# Patient Record
Sex: MALE | Race: WHITE
[De-identification: names, ages, dates, MRNs, and addresses within clinical notes are randomized per-mention and may not be internally consistent; named-entity substitution may affect disease eponyms.]

---

## 2020-06-07 ENCOUNTER — HOSPITAL ENCOUNTER (EMERGENCY)
Dept: HOSPITAL 46 - ED | Age: 70
End: 2020-06-07
Payer: MEDICARE

## 2020-06-07 VITALS — WEIGHT: 139.99 LBS | HEIGHT: 70 IN | BODY MASS INDEX: 20.04 KG/M2

## 2020-06-07 DIAGNOSIS — S92.324A: Primary | ICD-10-CM

## 2020-06-07 DIAGNOSIS — Z79.899: ICD-10-CM

## 2020-06-07 DIAGNOSIS — F17.200: ICD-10-CM
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
## 2020-06-07 DIAGNOSIS — S92.334A: ICD-10-CM

## 2020-06-07 DIAGNOSIS — S29.9XXA: ICD-10-CM

## 2020-06-07 DIAGNOSIS — I10: ICD-10-CM

## 2020-06-07 NOTE — XMS
Encounter Summary
  Created on: 2020
 
 OswaldHudsonWalker Kobe
 External Reference #: [id number]
 : [birth year]
 Sex: Male
 
 Demographics
 
 
+-----------------------+----------------------+
| Address               | [address]      |
|                       | [place]  [address] |
+-----------------------+----------------------+
| Home Phone            | [phone]      |
+-----------------------+----------------------+
| Preferred Language    | Unknown              |
+-----------------------+----------------------+
| Marital Status        |               |
+-----------------------+----------------------+
| Church Affiliation | Unknown              |
+-----------------------+----------------------+
| Race                  | Unknown              |
+-----------------------+----------------------+
| Ethnic Group          | Unknown              |
+-----------------------+----------------------+
 
 
 Author
 
 
+--------------+--------------------------------------------+
| Author       | [facility]  |
|              | and [place]                                |
+--------------+--------------------------------------------+
| Organization | [facility]  |
|              | and [place]                                |
+--------------+--------------------------------------------+
| Address      | Unknown                                    |
+--------------+--------------------------------------------+
| Phone        | Unavailable                                |
+--------------+--------------------------------------------+
 
 
 
 Support
 
 
+---------------+--------------+-------------------+-----------------+
| Name          | Relationship | Address           | Phone           |
+---------------+--------------+-------------------+-----------------+
| Simona Kaiser   | ECON         | [address]      | [phone] |
|               |              | [place]   |                 |
|               |              | [address]             |                 |
+---------------+--------------+-------------------+-----------------+
| Yaima Bourne | ECON         | Unknown           | [phone] |
+---------------+--------------+-------------------+-----------------+
 
 
 
 
 Care Team Providers
 
 
+----------------------------+------+-----------------+
| Care Team Member Name      | Role | Phone           |
+----------------------------+------+-----------------+
| Michael Sinha  | PCP  | [phone] |
| MD                         |      |                 |
+----------------------------+------+-----------------+
 
 
 
 Encounter Details
 
 
+--------+-------------+----------------------+----------------------+----------------------
+
| Date   | Type        | Department           | Care Team            | Description          
|
+--------+-------------+----------------------+----------------------+----------------------
+
| / | Transcribed |   [facility]          |   Michael Sinha  | Syncope (Primary Dx) 
|
| 2014   |  Orders     | CARDIOLOGY  [address]    | MD [address]  [address] |                      
|
|        |             | [place], |  [place]         |                      
|
|        |             |  [place] [address]       | [place]        |                      
|
|        |             | [phone]         | [address]           |                      
|
|        |             |                      | [phone]         |                      
|
|        |             |                      | [phone] (Fax)   |                      
|
+--------+-------------+----------------------+----------------------+----------------------
+
 
 
 
 Social History
 
 
+----------------+-------+-----------+--------+------+
| Tobacco Use    | Types | Packs/Day | Years  | Date |
|                |       |           | Used   |      |
+----------------+-------+-----------+--------+------+
| Never Assessed |       |           |        |      |
+----------------+-------+-----------+--------+------+
 
 
 
+------------------+---------------+
| Sex Assigned at  | Date Recorded |
| Birth            |               |
+------------------+---------------+
| Not on file      |               |
 
+------------------+---------------+
 
 
 
+----------------+-------------+-------------+
| Job Start Date | Occupation  | Industry    |
+----------------+-------------+-------------+
| Not on file    | Not on file | Not on file |
+----------------+-------------+-------------+
 
 
 
+----------------+--------------+------------+
| Travel History | Travel Start | Travel End |
+----------------+--------------+------------+
 
 
 
+-------------------------------------+
| No recent travel history available. |
+-------------------------------------+
 documented as of this encounter
 
 Plan of Treatment
 Not on filedocumented as of this encounter
 
 Results
 Holter monitor - 48 hour (10/09/2014 12:03 PM PDT)
 
+------------------------------------------------------------------------+--------------+
| Narrative                                                              | Performed At |
+------------------------------------------------------------------------+--------------+
|   Jacqueline Honeycutt MD       10/9/2014 12:03           PATIENT NAME:   |              |
|   Aaron Akers     : 1950:             AGE: 64 y.o.      |              |
| MEDICAL RECORD NUMBER:   [id number]     PRIMARY CARE:  Michael       |              |
| [facility]ha  [place] CARDIOLOGIST:  Jacqueline Honeycutt MD           |              |
| 48-HOUR HOLTER MONITOR REPORT     DATE:    10/6/2014                   |              |
| IMPRESSION:     1.   The predominant rhythm is normal sinus with the   |              |
| heart rate   ranging between 55 and 100 beats per minute.   The        |              |
| average heart   rate was 73 beats per minute during the 48:32 hour     |              |
| recording.  2.   Very rare ventricular events including one couplet.   |              |
| 3.   Very rare supraventricular events including two couplets.  4.     |              |
|   No bradycardia or pauses.  5.   Patient did not return a diary.      |              |
|    Signed by: Jacqueline Honeycutt MD [facility]     10/9/2014, 11:53           |              |
+------------------------------------------------------------------------+--------------+
 
 
 
+-------------------------------------------------------------------------------------------
--------------------------------------------------------------------------------------------
------+
| Procedure Note                                                                            
                                                                                            
      |
+-------------------------------------------------------------------------------------------
--------------------------------------------------------------------------------------------
------+
|   Jacqueline Honeycutt MD - 10/09/2014 11:52 AM PDT  Formatting of this note might be       
                                                                                            
      |
 
| different from the original.PATIENT NAME:  Aaron Akers  : 1950:            
                                                                                            
      |
| AGE: 64 y.o.MEDICAL RECORD NUMBER:  [id number] PRIMARY CARE:Michael Shukla             
                                                                                            
      |
| [place] CARDIOLOGIST:Jacqueline Honeycutt MD 48-HOUR HOLTER MONITOR REPORTDATE:       
                                                                                            
      |
| 10/6/2014    IMPRESSION:1.  The predominant rhythm is normal sinus with the heart rate    
                                                                                            
      |
| ranging between 55 and 100 beats per minute.  The average heart rate was 73 beats per     
                                                                                            
      |
| minute during the 48:32 hour recording.2.  Very rare ventricular events including one     
                                                                                            
      |
| couplet.3.  Very rare supraventricular events including two couplets.4.  No bradycardia   
                                                                                            
      |
| or pauses.5.  Patient did not return a diary.Signed by: Jacqueline Honeycutt MD [facility]         
                                                                                            
      |
| 10/9/2014, 11:53                                                                          
                                                                                            
      |
|READING CARDIOLOGIST:                                                                      
                                                                                            
      |
|Jacqueline Honeycutt MD                                                                       
                                                                                            
      |
|                                                                                           
                                                                                            
      |
|                                                                                           
                                                                                            
      |
|48-HOUR HOLTER MONITOR REPORT                                                              
                                                                                            
      |
|                                                                                           
                                                                                            
      |
|DATE:   10/6/2014                                                                          
                                                                                            
      |
|                                                                                           
                                                                                            
      |
|                                                                                           
                                                                                            
      |
|IMPRESSION:                                                                                
                                                                                            
     |
|                                                                                           
                                                                                            
      |
 
|1.  The predominant rhythm is normal sinus with the heart rate ranging between 55 and 100 b
eats per minute.  The average heart rate was 73 beats per minute during the 48:32 hour recor
ding. |
|2.  Very rare ventricular events including one couplet.                                    
                                                                                            
      |
|3.  Very rare supraventricular events including two couplets.                              
                                                                                            
      |
|4.  No bradycardia or pauses.                                                              
                                                                                            
      |
|5.  Patient did not return a diary.                                                        
                                                                                            
      |
|                                                                                           
                                                                                            
      |
|                                                                                           
                                                                                            
      |
|Signed by: Jacqueline Honeycutt MD [facility]                                                       
                                                                                            
      |
|  10/9/2014, 11:53                                                                         
                                                                                            
      |
+-------------------------------------------------------------------------------------------
--------------------------------------------------------------------------------------------
------+
 documented in this encounter
 
 Visit Diagnoses
 
 
+-------------------------------------------+
| Diagnosis                                 |
+-------------------------------------------+
|   Syncope - Primary  Syncope and collapse |
+-------------------------------------------+
 documented in this encounter

## 2020-06-07 NOTE — XMS
Encounter Summary
  Created on: 2020
 
 OswaldHudsonWalker Kobe
 External Reference #: [id number]
 : [birth year]
 Sex: Male
 
 Demographics
 
 
+-----------------------+----------------------+
| Address               | [address]      |
|                       | [place]  [address] |
+-----------------------+----------------------+
| Home Phone            | [phone]      |
+-----------------------+----------------------+
| Preferred Language    | Unknown              |
+-----------------------+----------------------+
| Marital Status        |               |
+-----------------------+----------------------+
| Baptism Affiliation | Unknown              |
+-----------------------+----------------------+
| Race                  | Unknown              |
+-----------------------+----------------------+
| Ethnic Group          | Unknown              |
+-----------------------+----------------------+
 
 
 Author
 
 
+--------------+--------------------------------------------+
| Author       | [facility]  |
|              | and [facility]                                |
+--------------+--------------------------------------------+
| Organization | [facility]  |
|              | and [place]                                |
+--------------+--------------------------------------------+
| Address      | Unknown                                    |
+--------------+--------------------------------------------+
| Phone        | Unavailable                                |
+--------------+--------------------------------------------+
 
 
 
 Support
 
 
+---------------+--------------+-------------------+-----------------+
| Name          | Relationship | Address           | Phone           |
+---------------+--------------+-------------------+-----------------+
| Simona Kaiesr   | ECON         | [address]      | [phone] |
|               |              | [place]   |                 |
|               |              | [address]             |                 |
+---------------+--------------+-------------------+-----------------+
| Yaima Bourne | ECON         | Unknown           | [phone] |
+---------------+--------------+-------------------+-----------------+
 
 
 
 
 Care Team Providers
 
 
+----------------------------+------+-----------------+
| Care Team Member Name      | Role | Phone           |
+----------------------------+------+-----------------+
| Michael Sinha  | PCP  | [phone] |
| MD                         |      |                 |
+----------------------------+------+-----------------+
 
 
 
 Encounter Details
 
 
+--------+-----------+----------------------+----------------------+-------------+
| Date   | Type      | Department           | Care Team            | Description |
+--------+-----------+----------------------+----------------------+-------------+
| 10/06/ | Hospital  |   [facility] |   Michael Sinha  | Syncope     |
| 2014   | Encounter |  [facility]     | MD [facility]  [address] |             |
|        |           | MEDICINE  [address]      |  [address]         |             |
|        |           | [place]  [place], | [place]        |             |
|        |           |  [place] [address]       | [address]           |             |
|        |           | [phone]         | [phone]         |             |
|        |           |                      | [phone] (Fax)   |             |
+--------+-----------+----------------------+----------------------+-------------+
 
 
 
 Social History
 
 
+----------------+-------+-----------+--------+------+
| Tobacco Use    | Types | Packs/Day | Years  | Date |
|                |       |           | Used   |      |
+----------------+-------+-----------+--------+------+
| Never Assessed |       |           |        |      |
+----------------+-------+-----------+--------+------+
 
 
 
+------------------+---------------+
| Sex Assigned at  | Date Recorded |
| Birth            |               |
+------------------+---------------+
| Not on file      |               |
+------------------+---------------+
 
 
 
+----------------+-------------+-------------+
| Job Start Date | Occupation  | Industry    |
+----------------+-------------+-------------+
| Not on file    | Not on file | Not on file |
+----------------+-------------+-------------+
 
 
 
 
+----------------+--------------+------------+
| Travel History | Travel Start | Travel End |
+----------------+--------------+------------+
 
 
 
+-------------------------------------+
| No recent travel history available. |
+-------------------------------------+
 documented as of this encounter
 
 Plan of Treatment
 Not on filedocumented as of this encounter
 
 Procedures
 
 
+----------------------+--------+-------------+----------------------+----------------------
+
| Procedure Name       | Priori | Date/Time   | Associated Diagnosis | Comments             
|
|                      | ty     |             |                      |                      
|
+----------------------+--------+-------------+----------------------+----------------------
+
| HOLTER MONITOR - 48  | Routin | 10/09/2014  |   Syncope            |   Results for this   
|
| HOUR                 | e      | 12:03 PM    |                      | procedure are in the 
|
|                      |        | PDT         |                      |  results section.    
|
+----------------------+--------+-------------+----------------------+----------------------
+
 documented in this encounter
 
 Results
 Holter monitor - 48 hour (10/09/2014 12:03 PM PDT)
 
+------------------------------------------------------------------------+--------------+
| Narrative                                                              | Performed At |
+------------------------------------------------------------------------+--------------+
|   Jacqueline Honeycutt MD       10/9/2014 12:03           PATIENT NAME:   |              |
|   Aaron Akers     : 1950:             AGE: 64 y.o.      |              |
| MEDICAL RECORD NUMBER:   [id number]     PRIMARY CARE:  Michael       |              |
| [facility] HAMMOND CARDIOLOGIST:  Jacqueline Honeycutt MD           |              |
| 48-HOUR HOLTER MONITOR REPORT     DATE:    10/6/2014                   |              |
| IMPRESSION:     1.   The predominant rhythm is normal sinus with the   |              |
| heart rate   ranging between 55 and 100 beats per minute.   The        |              |
| average heart   rate was 73 beats per minute during the 48:32 hour     |              |
| recording.  2.   Very rare ventricular events including one couplet.   |              |
| 3.   Very rare supraventricular events including two couplets.  4.     |              |
|   No bradycardia or pauses.  5.   Patient did not return a diary.      |              |
|    Signed by: Jacqueline Honeycutt MD [facility]     10/9/2014, 11:53           |              |
+------------------------------------------------------------------------+--------------+
 
 
 
+-------------------------------------------------------------------------------------------
--------------------------------------------------------------------------------------------
 
------+
| Procedure Note                                                                            
                                                                                            
      |
+-------------------------------------------------------------------------------------------
--------------------------------------------------------------------------------------------
------+
|   Jacqueline Honeycutt MD - 10/09/2014 11:52 AM PDT  Formatting of this note might be       
                                                                                            
      |
| different from the original.PATIENT NAME:  Aaron Akers  : 1950:            
                                                                                            
      |
| AGE: 64 y.o.MEDICAL RECORD NUMBER:  [id number] PRIMARY CARE:Michael Shukla             
                                                                                            
      |
| MaxxING CARDIOLOGIST:Jacqueline Honeycutt MD 48-HOUR HOLTER MONITOR REPORTDATE:       
                                                                                            
      |
| 10/6/2014    IMPRESSION:1.  The predominant rhythm is normal sinus with the heart rate    
                                                                                            
      |
| ranging between 55 and 100 beats per minute.  The average heart rate was 73 beats per     
                                                                                            
      |
| minute during the 48:32 hour recording.2.  Very rare ventricular events including one     
                                                                                            
      |
| couplet.3.  Very rare supraventricular events including two couplets.4.  No bradycardia   
                                                                                            
      |
| or pauses.5.  Patient did not return a diary.Signed by: Jacqueline Honeycutt MD [facility]         
                                                                                            
      |
| 10/9/2014, 11:53                                                                          
                                                                                            
      |
|READING CARDIOLOGIST:                                                                      
                                                                                            
      |
|Jacqueline Honeycutt MD                                                                       
                                                                                            
      |
|                                                                                           
                                                                                            
      |
|                                                                                           
                                                                                            
      |
|48-HOUR HOLTER MONITOR REPORT                                                              
                                                                                            
      |
|                                                                                           
                                                                                            
      |
|DATE:   10/6/2014                                                                          
                                                                                            
      |
|                                                                                           
                                                                                            
 
      |
|                                                                                           
                                                                                            
      |
|IMPRESSION:                                                                                
                                                                                            
     |
|                                                                                           
                                                                                            
      |
|1.  The predominant rhythm is normal sinus with the heart rate ranging between 55 and 100 b
eats per minute.  The average heart rate was 73 beats per minute during the 48:32 hour recor
ding. |
|2.  Very rare ventricular events including one couplet.                                    
                                                                                            
      |
|3.  Very rare supraventricular events including two couplets.                              
                                                                                            
      |
|4.  No bradycardia or pauses.                                                              
                                                                                            
      |
|5.  Patient did not return a diary.                                                        
                                                                                            
      |
|                                                                                           
                                                                                            
      |
|                                                                                           
                                                                                            
      |
|Signed by: Jacqueline Honeycutt MD [facility]                                                       
                                                                                            
      |
|  10/9/2014, 11:53                                                                         
                                                                                            
      |
+-------------------------------------------------------------------------------------------
--------------------------------------------------------------------------------------------
------+
 documented in this encounter
 
 Visit Diagnoses
 
 
+---------------------------------+
| Diagnosis                       |
+---------------------------------+
|   Syncope  Syncope and collapse |
+---------------------------------+
 documented in this encounter

## 2020-06-07 NOTE — EKG
[facility]
                                    [address]
                                  [place]  [address]
_________________________________________________________________________________________
                                                                 Signed   
 
 
Normal sinus rhythm
Possible Left atrial enlargement
Rightward axis
Possible Anterior infarct , age undetermined
Abnormal ECG
 
Confirmed by MAMADOU SALCEDO MD ([id number]) on 6/7/2020 7:24:16 PM
 
 
 
 
 
 
 
 
 
 
 
 
 
 
 
 
 
 
 
 
 
 
 
 
 
 
 
 
 
 
 
 
 
 
 
 
 
 
    Electronically Signed By: MAMADOU SALCEDO MD  06/07/20 1924
_________________________________________________________________________________________
PATIENT NAME:     DAYANNA SHIN KASSY                    
MEDICAL RECORD #: [id number]                     Electrocardiogram             
          ACCT #: [id number]  
DATE OF BIRTH:   [birth year]                                       
PHYSICIAN:   MAMADOU SALCEDO MD                   REPORT #: [id number]
REPORT IS CONFIDENTIAL AND NOT TO BE RELEASED WITHOUT AUTHORIZATION

## 2020-06-07 NOTE — XMS
Encounter Summary
  Created on: 2020
 
 OswaldHudsonWalker Kobe
 External Reference #: [id number]
 : [birth year]
 Sex: Male
 
 Demographics
 
 
+-----------------------+----------------------+
| Address               | [address]      |
|                       | [place]  [address] |
+-----------------------+----------------------+
| Home Phone            | [phone]      |
+-----------------------+----------------------+
| Preferred Language    | Unknown              |
+-----------------------+----------------------+
| Marital Status        |               |
+-----------------------+----------------------+
| Yarsani Affiliation | Unknown              |
+-----------------------+----------------------+
| Race                  | Unknown              |
+-----------------------+----------------------+
| Ethnic Group          | Unknown              |
+-----------------------+----------------------+
 
 
 Author
 
 
+--------------+--------------------------------------------+
| Author       | [facility]  |
|              | and [place]                                |
+--------------+--------------------------------------------+
| Organization | [facility]  |
|              | and [place]                                |
+--------------+--------------------------------------------+
| Address      | Unknown                                    |
+--------------+--------------------------------------------+
| Phone        | Unavailable                                |
+--------------+--------------------------------------------+
 
 
 
 Support
 
 
+---------------+--------------+-------------------+-----------------+
| Name          | Relationship | Address           | Phone           |
+---------------+--------------+-------------------+-----------------+
| Simona Kaiser   | ECON         | [address]      | [phone] |
|               |              | [place]   |                 |
|               |              | [id number]             |                 |
+---------------+--------------+-------------------+-----------------+
| Yaima Bourne | ECON         | Unknown           | [phone] |
+---------------+--------------+-------------------+-----------------+
 
 
 
 
 Care Team Providers
 
 
+----------------------------+------+-----------------+
| Care Team Member Name      | Role | Phone           |
+----------------------------+------+-----------------+
| Michael Sinha  | PCP  | [phone] |
| MD                         |      |                 |
+----------------------------+------+-----------------+
 
 
 
 Encounter Details
 
 
+--------+-------------+----------------------+----------------------+----------------------
+
| Date   | Type        | Department           | Care Team            | Description          
|
+--------+-------------+----------------------+----------------------+----------------------
+
| / | Transcribed |   [facility]          |   Michael Sinha  | Syncope (Primary Dx) 
|
| 2014   |  Orders     | CARDIOLOGY  [address]    | MD [address]  [address] |                      
|
|        |             | [place]  [place], |  [place]         |                      
|
|        |             |  [place] [address]       | [place]        |                      
|
|        |             | [phone]         | [address]           |                      
|
|        |             |                      | [phone]         |                      
|
|        |             |                      | [phone] (Fax)   |                      
|
+--------+-------------+----------------------+----------------------+----------------------
+
 
 
 
 Social History
 
 
+----------------+-------+-----------+--------+------+
| Tobacco Use    | Types | Packs/Day | Years  | Date |
|                |       |           | Used   |      |
+----------------+-------+-----------+--------+------+
| Never Assessed |       |           |        |      |
+----------------+-------+-----------+--------+------+
 
 
 
+------------------+---------------+
| Sex Assigned at  | Date Recorded |
| Birth            |               |
+------------------+---------------+
| Not on file      |               |
 
+------------------+---------------+
 
 
 
+----------------+-------------+-------------+
| Job Start Date | Occupation  | Industry    |
+----------------+-------------+-------------+
| Not on file    | Not on file | Not on file |
+----------------+-------------+-------------+
 
 
 
+----------------+--------------+------------+
| Travel History | Travel Start | Travel End |
+----------------+--------------+------------+
 
 
 
+-------------------------------------+
| No recent travel history available. |
+-------------------------------------+
 documented as of this encounter
 
 Plan of Treatment
 Not on filedocumented as of this encounter
 
 Results
 Holter monitor - 48 hour (10/09/2014 12:03 PM PDT)
 
+------------------------------------------------------------------------+--------------+
| Narrative                                                              | Performed At |
+------------------------------------------------------------------------+--------------+
|   Jacqueline Honeycutt MD       10/9/2014 12:03           PATIENT NAME:   |              |
|   Aaron Akers     : 1950:             AGE: 64 y.o.      |              |
| MEDICAL RECORD NUMBER:   [id number]     PRIMARY CARE:  Michael       |              |
| [facility]  [place] CARDIOLOGIST:  Jacqueline Honeycutt MD           |              |
| 48-HOUR HOLTER MONITOR REPORT     DATE:    10/6/2014                   |              |
| IMPRESSION:     1.   The predominant rhythm is normal sinus with the   |              |
| heart rate   ranging between 55 and 100 beats per minute.   The        |              |
| average heart   rate was 73 beats per minute during the 48:32 hour     |              |
| recording.  2.   Very rare ventricular events including one couplet.   |              |
| 3.   Very rare supraventricular events including two couplets.  4.     |              |
|   No bradycardia or pauses.  5.   Patient did not return a diary.      |              |
|    Signed by: Jacqueline Honeycutt MD [facility]     10/9/2014, 11:53           |              |
+------------------------------------------------------------------------+--------------+
 
 
 
+-------------------------------------------------------------------------------------------
--------------------------------------------------------------------------------------------
------+
| Procedure Note                                                                            
                                                                                            
      |
+-------------------------------------------------------------------------------------------
--------------------------------------------------------------------------------------------
------+
|   Jacqueline Honeycutt MD - 10/09/2014 11:52 AM PDT  Formatting of this note might be       
                                                                                            
      |
 
| different from the original.PATIENT NAME:  Aaron Akers  : 1950:            
                                                                                            
      |
| AGE: 64 y.o.MEDICAL RECORD NUMBER:  [id number] PRIMARY CARE:Michael Shukla             
                                                                                            
      |
| [place] CARDIOLOGIST:Jacqueline Honeycutt MD 48-HOUR HOLTER MONITOR REPORTDATE:       
                                                                                            
      |
| 10/6/2014    IMPRESSION:1.  The predominant rhythm is normal sinus with the heart rate    
                                                                                            
      |
| ranging between 55 and 100 beats per minute.  The average heart rate was 73 beats per     
                                                                                            
      |
| minute during the 48:32 hour recording.2.  Very rare ventricular events including one     
                                                                                            
      |
| couplet.3.  Very rare supraventricular events including two couplets.4.  No bradycardia   
                                                                                            
      |
| or pauses.5.  Patient did not return a diary.Signed by: Jacqueline Honeycutt MD [facility]         
                                                                                            
      |
| 10/9/2014, 11:53                                                                          
                                                                                            
      |
|READING CARDIOLOGIST:                                                                      
                                                                                            
      |
|Jacqueline Honeycutt MD                                                                       
                                                                                            
      |
|                                                                                           
                                                                                            
      |
|                                                                                           
                                                                                            
      |
|48-HOUR HOLTER MONITOR REPORT                                                              
                                                                                            
      |
|                                                                                           
                                                                                            
      |
|DATE:   10/6/2014                                                                          
                                                                                            
      |
|                                                                                           
                                                                                            
      |
|                                                                                           
                                                                                            
      |
|IMPRESSION:                                                                                
                                                                                            
     |
|                                                                                           
                                                                                            
      |
 
|1.  The predominant rhythm is normal sinus with the heart rate ranging between 55 and 100 b
eats per minute.  The average heart rate was 73 beats per minute during the 48:32 hour recor
ding. |
|2.  Very rare ventricular events including one couplet.                                    
                                                                                            
      |
|3.  Very rare supraventricular events including two couplets.                              
                                                                                            
      |
|4.  No bradycardia or pauses.                                                              
                                                                                            
      |
|5.  Patient did not return a diary.                                                        
                                                                                            
      |
|                                                                                           
                                                                                            
      |
|                                                                                           
                                                                                            
      |
|Signed by: Jacqueline Honeycutt MD [facility]                                                       
                                                                                            
      |
|  10/9/2014, 11:53                                                                         
                                                                                            
      |
+-------------------------------------------------------------------------------------------
--------------------------------------------------------------------------------------------
------+
 documented in this encounter
 
 Visit Diagnoses
 
 
+-------------------------------------------+
| Diagnosis                                 |
+-------------------------------------------+
|   Syncope - Primary  Syncope and collapse |
+-------------------------------------------+
 documented in this encounter

## 2020-06-07 NOTE — XMS
Clinical Summary
  Created on: 2020
 
 Aaron Akers
 External Reference #: [id number]
 : [birth year]
 Sex: Male
 
 Demographics
 
 
+-----------------------+----------------------+
| Address               | [address]      |
|                       | [place]  [address] |
+-----------------------+----------------------+
| Home Phone            | [phone]      |
+-----------------------+----------------------+
| Preferred Language    | Unknown              |
+-----------------------+----------------------+
| Marital Status        |               |
+-----------------------+----------------------+
| Faith Affiliation | Unknown              |
+-----------------------+----------------------+
| Race                  | Unknown              |
+-----------------------+----------------------+
| Ethnic Group          | Unknown              |
+-----------------------+----------------------+
 
 
 Author
 
 
+--------------+--------------------------------------------+
| Author       | [facility]  |
|              | and [place]                                |
+--------------+--------------------------------------------+
| Organization | [facility] [place]  |
|              | and [place]                                |
+--------------+--------------------------------------------+
| Address      | Unknown                                    |
+--------------+--------------------------------------------+
| Phone        | Unavailable                                |
+--------------+--------------------------------------------+
 
 
 
 Support
 
 
+---------------+--------------+-------------------+-----------------+
| Name          | Relationship | Address           | Phone           |
+---------------+--------------+-------------------+-----------------+
| Simona Kaiser   | ECON         | [address]      | [phone] |
|               |              | [place]   |                 |
|               |              | [address]             |                 |
+---------------+--------------+-------------------+-----------------+
| Yaima Bourne | ECON         | Unknown           | [phone] |
+---------------+--------------+-------------------+-----------------+
 
 
 
 
 Care Team Providers
 
 
+----------------------------+------+-----------------+
| Care Team Member Name      | Role | Phone           |
+----------------------------+------+-----------------+
| Michael Sinha  | PCP  | [phone] |
| MD                         |      |                 |
+----------------------------+------+-----------------+
 
 
 
 Allergies
 Not on File
 
 Medications
 Not on file
 
 Active Problems
 
 
+---------+------------+
| Problem | Noted Date |
+---------+------------+
| Syncope | 2014 |
+---------+------------+
 
 
 
 Social History
 
 
+----------------+-------+-----------+--------+------+
| Tobacco Use    | Types | Packs/Day | Years  | Date |
|                |       |           | Used   |      |
+----------------+-------+-----------+--------+------+
| Never Assessed |       |           |        |      |
+----------------+-------+-----------+--------+------+
 
 
 
+------------------+---------------+
| Sex Assigned at  | Date Recorded |
| Birth            |               |
+------------------+---------------+
| Not on file      |               |
+------------------+---------------+
 
 
 
+----------------+-------------+-------------+
| Job Start Date | Occupation  | Industry    |
+----------------+-------------+-------------+
| Not on file    | Not on file | Not on file |
+----------------+-------------+-------------+
 
 
 
 
+----------------+--------------+------------+
| Travel History | Travel Start | Travel End |
+----------------+--------------+------------+
 
 
 
+-------------------------------------+
| No recent travel history available. |
+-------------------------------------+
 
 
 
 Last Filed Vital Signs
 Not on file
 
 Plan of Treatment
 
 
+----------------------+-----------+-----------+----------+
| Health Maintenance   | Due Date  | Last Done | Comments |
+----------------------+-----------+-----------+----------+
| Vaccine:             | 01/15/196 |           |          |
| Dtap/Tdap/Td (1 -    | 1         |           |          |
| Tdap)                |           |           |          |
+----------------------+-----------+-----------+----------+
| Vaccine: Zoster (1   | 01/15/200 |           |          |
| of 2)                | 0         |           |          |
+----------------------+-----------+-----------+----------+
| Vaccine:             | 01/15/201 |           |          |
| Pneumococcal 65+ (1  | 5         |           |          |
| of 2 - PCV13)        |           |           |          |
+----------------------+-----------+-----------+----------+
| Vaccine: Influenza   |  |           |          |
| (Season Ended)       | 0         |           |          |
+----------------------+-----------+-----------+----------+
 
 
 
 Results
 Not on filefrom Last 3 Months
 
 Insurance
 
 
+-------+--------+-------------+--------+-------+---------+------+
| Payer | Benefi | Subscriber  | Effect | Phone | Address | Type |
|       | t Plan | ID          | gilberto    |       |         |      |
|       |  /     |             | Dates  |       |         |      |
|       | Group  |             |        |       |         |      |
+-------+--------+-------------+--------+-------+---------+------+
| [employer]  | [employer]   | [id number] | 20 |       |         | PPO  |
|       | OOS    | 2           | 14-Pre |       |         |      |
|       | PPO    |             | sent   |       |         |      |
+-------+--------+-------------+--------+-------+---------+------+
 
 
 
+--------------------+--------+-------------+--------+-------------+---------------------+
| Guarantor Name     | [employer] | Relation to | Date   | Phone       | Billing Address     |
 
|                    | t Type |  Patient    | of     |             |                     |
|                    |        |             | Birth  |             |                     |
+--------------------+--------+-------------+--------+-------------+---------------------+
| Aaron Akers | Person | Self        | [birth year]             |   [address]   |
|                    | al/Fam |             | 1950   | [phone] | [place] [address] |
|                    | [place]ney    |             |        | 2 (Home)    |                     |
+--------------------+--------+-------------+--------+-------------+---------------------+
 
 
 
 Advance Directives
 
 
+-----------+-----------------+----------------+-------------+
| Type      | Date Recorded   | Patient        | Explanation |
|           |                 | Representative |             |
+-----------+-----------------+----------------+-------------+
| Power of  |                 |                |             |
|   |                 |                |             |
+-----------+-----------------+----------------+-------------+
| Advance   | 10/6/2014 12:47 |                |             |
| Directive |  PM             |                |             |
+-----------+-----------------+----------------+-------------+

## 2020-06-07 NOTE — XMS
Clinical Summary
  Created on: 2020
 
 Aaron Akers
 External Reference #: [id number]
 : [birth year]
 Sex: Male
 
 Demographics
 
 
+-----------------------+----------------------+
| Address               | [address]      |
|                       | [place]  [address] |
+-----------------------+----------------------+
| Home Phone            | [phone]      |
+-----------------------+----------------------+
| Preferred Language    | Unknown              |
+-----------------------+----------------------+
| Marital Status        |               |
+-----------------------+----------------------+
| Yazidi Affiliation | Unknown              |
+-----------------------+----------------------+
| Race                  | Unknown              |
+-----------------------+----------------------+
| Ethnic Group          | Unknown              |
+-----------------------+----------------------+
 
 
 Author
 
 
+--------------+--------------------------------------------+
| Author       | [facility]  |
|              | and [place]                                |
+--------------+--------------------------------------------+
| Organization | [facility]  |
|              | and [place]                                |
+--------------+--------------------------------------------+
| Address      | Unknown                                    |
+--------------+--------------------------------------------+
| Phone        | Unavailable                                |
+--------------+--------------------------------------------+
 
 
 
 Support
 
 
+---------------+--------------+-------------------+-----------------+
| Name          | Relationship | Address           | Phone           |
+---------------+--------------+-------------------+-----------------+
| Simona Kaiser   | ECON         | [address]      | [phone] |
|               |              | [place]   |                 |
|               |              | [id number]             |                 |
+---------------+--------------+-------------------+-----------------+
| Yaima Bourne | ECON         | Unknown           | [phone] |
+---------------+--------------+-------------------+-----------------+
 
 
 
 
 Care Team Providers
 
 
+----------------------------+------+-----------------+
| Care Team Member Name      | Role | Phone           |
+----------------------------+------+-----------------+
| Michael Sinha  | PCP  | [phone] |
| MD                         |      |                 |
+----------------------------+------+-----------------+
 
 
 
 Allergies
 Not on File
 
 Medications
 Not on file
 
 Active Problems
 
 
+---------+------------+
| Problem | Noted Date |
+---------+------------+
| Syncope | 2014 |
+---------+------------+
 
 
 
 Social History
 
 
+----------------+-------+-----------+--------+------+
| Tobacco Use    | Types | Packs/Day | Years  | Date |
|                |       |           | Used   |      |
+----------------+-------+-----------+--------+------+
| Never Assessed |       |           |        |      |
+----------------+-------+-----------+--------+------+
 
 
 
+------------------+---------------+
| Sex Assigned at  | Date Recorded |
| Birth            |               |
+------------------+---------------+
| Not on file      |               |
+------------------+---------------+
 
 
 
+----------------+-------------+-------------+
| Job Start Date | Occupation  | Industry    |
+----------------+-------------+-------------+
| Not on file    | Not on file | Not on file |
+----------------+-------------+-------------+
 
 
 
 
+----------------+--------------+------------+
| Travel History | Travel Start | Travel End |
+----------------+--------------+------------+
 
 
 
+-------------------------------------+
| No recent travel history available. |
+-------------------------------------+
 
 
 
 Last Filed Vital Signs
 Not on file
 
 Plan of Treatment
 
 
+----------------------+-----------+-----------+----------+
| Health Maintenance   | Due Date  | Last Done | Comments |
+----------------------+-----------+-----------+----------+
| Vaccine:             | 01/15/196 |           |          |
| Dtap/Tdap/Td (1 -    | 1         |           |          |
| Tdap)                |           |           |          |
+----------------------+-----------+-----------+----------+
| Vaccine: Zoster (1   | 01/15/200 |           |          |
| of 2)                | 0         |           |          |
+----------------------+-----------+-----------+----------+
| Vaccine:             | 01/15/201 |           |          |
| Pneumococcal 65+ (1  | 5         |           |          |
| of 2 - PCV13)        |           |           |          |
+----------------------+-----------+-----------+----------+
| Vaccine: Influenza   |  |           |          |
| (Season Ended)       | 0         |           |          |
+----------------------+-----------+-----------+----------+
 
 
 
 Results
 Not on filefrom Last 3 Months
 
 Insurance
 
 
+-------+--------+-------------+--------+-------+---------+------+
| Payer | Benefi | Subscriber  | Effect | Phone | Address | Type |
|       | t Plan | ID          | gilberto    |       |         |      |
|       |  /     |             | Dates  |       |         |      |
|       | Group  |             |        |       |         |      |
+-------+--------+-------------+--------+-------+---------+------+
| [employer]  | [employer]   | [id number] | 20 |       |         | PPO  |
|       | OOS    | 2           | 14-Pre |       |         |      |
|       | PPO    |             | sent   |       |         |      |
+-------+--------+-------------+--------+-------+---------+------+
 
 
 
+--------------------+--------+-------------+--------+-------------+---------------------+
| Guarantor Name     | [employer] | Relation to | Date   | Phone       | Billing Address     |
 
|                    | t Type |  Patient    | of     |             |                     |
|                    |        |             | Birth  |             |                     |
+--------------------+--------+-------------+--------+-------------+---------------------+
| Aaron Akers | Person | Self        | [birth year]             |   [address]   |
|                    | al/Fam |             | 1950   | [phone] | [place] [address] |
|                    | [place]ney    |             |        | 2 (Home)    |                     |
+--------------------+--------+-------------+--------+-------------+---------------------+
 
 
 
 Advance Directives
 
 
+-----------+-----------------+----------------+-------------+
| Type      | Date Recorded   | Patient        | Explanation |
|           |                 | Representative |             |
+-----------+-----------------+----------------+-------------+
| Power of  |                 |                |             |
|   |                 |                |             |
+-----------+-----------------+----------------+-------------+
| Advance   | 10/6/2014 12:47 |                |             |
| Directive |  PM             |                |             |
+-----------+-----------------+----------------+-------------+

## 2022-07-07 NOTE — XMS
Department of Anesthesiology  Preprocedure Note       Name:  Jenna Lemus   Age:  39 y.o.  :  1977                                          MRN:  [id number]         Date:  2022      Surgeon: Robyn oLtt):  Arpita Singh DO    Procedure: Procedure(s):  COLONOSCOPY    Medications prior to admission:   Prior to Admission medications    Medication Sig Start Date End Date Taking? Authorizing Provider   bisacodyl (BISACODYL) 5 MG EC tablet Take two tablets at noon on 22 for colonoscopy bowel prep. 22   Arpita Spoon, DO   PEG 3350-KCl-NaBcb-NaCl-NaSulf (PEG-3350/ELECTROLYTES) 236 g SOLR Mix as directed. Beginning at 4:00pm on 22 drink an eight ounce glass every ten minutes until gone. 22   Arpita Spoon, DO   escitalopram (LEXAPRO) 10 MG tablet Take 1 tablet by mouth daily 22  Moises Summers MD       Current medications:    Current Facility-Administered Medications   Medication Dose Route Frequency Provider Last Rate Last Admin    lactated ringers infusion   IntraVENous Continuous Arpita Spoon, DO        sodium chloride flush 0.9 % injection 5-40 mL  5-40 mL IntraVENous 2 times per day Arpita Spoon, DO        sodium chloride flush 0.9 % injection 5-40 mL  5-40 mL IntraVENous PRN Arpita Spoon, DO        0.9 % sodium chloride infusion   IntraVENous PRN Arpita Spoon, DO           Allergies:  No Known Allergies    Problem List:    Patient Active Problem List   Diagnosis Code    Lumbago M54.50    Backache M54.9       Past Medical History:  History reviewed. No pertinent past medical history.     Past Surgical History:        Procedure Laterality Date    ANTERIOR CRUCIATE LIGAMENT REPAIR Right        Social History:    Social History     Tobacco Use    Smoking status: Never Smoker    Smokeless tobacco: Never Used   Substance Use Topics    Alcohol use: Yes     Comment: rarely                                Counseling given: Not Encounter Summary
  Created on: 2020
 
 OswaldHudsonWalker Kobe
 External Reference #: [id number]
 : [birth year]
 Sex: Male
 
 Demographics
 
 
+-----------------------+----------------------+
| Address               | [address]      |
|                       | [place]  [id number] |
+-----------------------+----------------------+
| Home Phone            | [phone]      |
+-----------------------+----------------------+
| Preferred Language    | Unknown              |
+-----------------------+----------------------+
| Marital Status        |               |
+-----------------------+----------------------+
| Nondenominational Affiliation | Unknown              |
+-----------------------+----------------------+
| Race                  | Unknown              |
+-----------------------+----------------------+
| Ethnic Group          | Unknown              |
+-----------------------+----------------------+
 
 
 Author
 
 
+--------------+--------------------------------------------+
| Author       | [facility]  |
|              | and [place]                                |
+--------------+--------------------------------------------+
| Organization | [facility]  |
|              | and [place]                                |
+--------------+--------------------------------------------+
| Address      | Unknown                                    |
+--------------+--------------------------------------------+
| Phone        | Unavailable                                |
+--------------+--------------------------------------------+
 
 
 
 Support
 
 
+---------------+--------------+-------------------+-----------------+
| Name          | Relationship | Address           | Phone           |
+---------------+--------------+-------------------+-----------------+
| Simona Kaiser   | ECON         | [address]      | [phone] |
|               |              | [place]   |                 |
|               |              | [address]             |                 |
+---------------+--------------+-------------------+-----------------+
| Yaima Bourne | ECON         | Unknown           | [phone] |
+---------------+--------------+-------------------+-----------------+
 
 
 
 
 Care Team Providers
 
 
+----------------------------+------+-----------------+
| Care Team Member Name      | Role | Phone           |
+----------------------------+------+-----------------+
| Michael Sinha  | PCP  | [phone] |
| MD                         |      |                 |
+----------------------------+------+-----------------+
 
 
 
 Encounter Details
 
 
+--------+-----------+----------------------+----------------------+-------------+
| Date   | Type      | Department           | Care Team            | Description |
+--------+-----------+----------------------+----------------------+-------------+
| 10/06/ | Hospital  |   [facility] |   Michael Sinha  | Syncope     |
| 2014   | Encounter |  [facility]     | MD [facility]  [address] |             |
|        |           | MEDICINE  [address]      |  [address]         |             |
|        |           | [place], | [place]        |             |
|        |           |  [place] [address]       | [address]           |             |
|        |           | [phone]         | [phone]         |             |
|        |           |                      | [phone] (Fax)   |             |
+--------+-----------+----------------------+----------------------+-------------+
 
 
 
 Social History
 
 
+----------------+-------+-----------+--------+------+
| Tobacco Use    | Types | Packs/Day | Years  | Date |
|                |       |           | Used   |      |
+----------------+-------+-----------+--------+------+
| Never Assessed |       |           |        |      |
+----------------+-------+-----------+--------+------+
 
 
 
+------------------+---------------+
| Sex Assigned at  | Date Recorded |
| Birth            |               |
+------------------+---------------+
| Not on file      |               |
+------------------+---------------+
 
 
 
+----------------+-------------+-------------+
| Job Start Date | Occupation  | Industry    |
+----------------+-------------+-------------+
| Not on file    | Not on file | Not on file |
+----------------+-------------+-------------+
 
 
 
 
+----------------+--------------+------------+
| Travel History | Travel Start | Travel End |
+----------------+--------------+------------+
 
 
 
+-------------------------------------+
| No recent travel history available. |
+-------------------------------------+
 documented as of this encounter
 
 Plan of Treatment
 Not on filedocumented as of this encounter
 
 Procedures
 
 
+----------------------+--------+-------------+----------------------+----------------------
+
| Procedure Name       | Priori | Date/Time   | Associated Diagnosis | Comments             
|
|                      | ty     |             |                      |                      
|
+----------------------+--------+-------------+----------------------+----------------------
+
| HOLTER MONITOR - 48  | Routin | 10/09/2014  |   Syncope            |   Results for this   
|
| HOUR                 | e      | 12:03 PM    |                      | procedure are in the 
|
|                      |        | PDT         |                      |  results section.    
|
+----------------------+--------+-------------+----------------------+----------------------
+
 documented in this encounter
 
 Results
 Holter monitor - 48 hour (10/09/2014 12:03 PM PDT)
 
+------------------------------------------------------------------------+--------------+
| Narrative                                                              | Performed At |
+------------------------------------------------------------------------+--------------+
|   Jacqueline Honeycutt MD       10/9/2014 12:03           PATIENT NAME:   |              |
|   Aaron Akers     : 1950:             AGE: 64 y.o.      |              |
| MEDICAL RECORD NUMBER:   [id number]     PRIMARY CARE:  Michael       |              |
| [facility] HAMMOND CARDIOLOGIST:  Jacqueline Honeycutt MD           |              |
| 48-HOUR HOLTER MONITOR REPORT     DATE:    10/6/2014                   |              |
| IMPRESSION:     1.   The predominant rhythm is normal sinus with the   |              |
| heart rate   ranging between 55 and 100 beats per minute.   The        |              |
| average heart   rate was 73 beats per minute during the 48:32 hour     |              |
| recording.  2.   Very rare ventricular events including one couplet.   |              |
| 3.   Very rare supraventricular events including two couplets.  4.     |              |
|   No bradycardia or pauses.  5.   Patient did not return a diary.      |              |
|    Signed by: Jacqueline Honeycutt MD [facility]     10/9/2014, 11:53           |              |
+------------------------------------------------------------------------+--------------+
 
 
 
+-------------------------------------------------------------------------------------------
--------------------------------------------------------------------------------------------
 
------+
| Procedure Note                                                                            
                                                                                            
      |
+-------------------------------------------------------------------------------------------
--------------------------------------------------------------------------------------------
------+
|   Jacqueline Honeycutt MD - 10/09/2014 11:52 AM PDT  Formatting of this note might be       
                                                                                            
      |
| different from the original.PATIENT NAME:  Aaron Akers  : 1950:            
                                                                                            
      |
| AGE: 64 y.o.MEDICAL RECORD NUMBER:  [id number] PRIMARY CARE:Michael Shukla             
                                                                                            
      |
| MaxxING CARDIOLOGIST:Jacqueline Honeycutt MD 48-HOUR HOLTER MONITOR REPORTDATE:       
                                                                                            
      |
| 10/6/2014    IMPRESSION:1.  The predominant rhythm is normal sinus with the heart rate    
                                                                                            
      |
| ranging between 55 and 100 beats per minute.  The average heart rate was 73 beats per     
                                                                                            
      |
| minute during the 48:32 hour recording.2.  Very rare ventricular events including one     
                                                                                            
      |
| couplet.3.  Very rare supraventricular events including two couplets.4.  No bradycardia   
                                                                                            
      |
| or pauses.5.  Patient did not return a diary.Signed by: Jacqueline Honeycutt MD [facility]         
                                                                                            
      |
| 10/9/2014, 11:53                                                                          
                                                                                            
      |
|READING CARDIOLOGIST:                                                                      
                                                                                            
      |
|Jacqueline Honeycutt MD                                                                       
                                                                                            
      |
|                                                                                           
                                                                                            
      |
|                                                                                           
                                                                                            
      |
|48-HOUR HOLTER MONITOR REPORT                                                              
                                                                                            
      |
|                                                                                           
                                                                                            
      |
|DATE:   10/6/2014                                                                          
                                                                                            
      |
|                                                                                           
                                                                                            
 
      |
|                                                                                           
                                                                                            
      |
|IMPRESSION:                                                                                
                                                                                            
     |
|                                                                                           
                                                                                            
      |
|1.  The predominant rhythm is normal sinus with the heart rate ranging between 55 and 100 b
eats per minute.  The average heart rate was 73 beats per minute during the 48:32 hour recor
ding. |
|2.  Very rare ventricular events including one couplet.                                    
                                                                                            
      |
|3.  Very rare supraventricular events including two couplets.                              
                                                                                            
      |
|4.  No bradycardia or pauses.                                                              
                                                                                            
      |
|5.  Patient did not return a diary.                                                        
                                                                                            
      |
|                                                                                           
                                                                                            
      |
|                                                                                           
                                                                                            
      |
|Signed by: Jacqueline Honeycutt MD [facility]                                                       
                                                                                            
      |
|  10/9/2014, 11:53                                                                         
                                                                                            
      |
+-------------------------------------------------------------------------------------------
--------------------------------------------------------------------------------------------
------+
 documented in this encounter
 
 Visit Diagnoses
 
 
+---------------------------------+
| Diagnosis                       |
+---------------------------------+
|   Syncope  Syncope and collapse |
+---------------------------------+
 documented in this encounter Answered      Vital Signs (Current):   Vitals:    07/07/22 0744   BP: 138/75   Pulse: 79   Resp: 16   Temp: 36.3 °C (97.3 °F)   TempSrc: Oral   SpO2: 99%   Weight: 194 lb 12.8 oz (88.4 kg)   Height: 6' 2\" (1.88 m)                                              BP Readings from Last 3 Encounters:   07/07/22 138/75   05/16/22 100/64   04/16/21 100/64       NPO Status: Time of last liquid consumption: 2300                        Time of last solid consumption: 2000                        Date of last liquid consumption: 07/06/22                        Date of last solid food consumption: 07/05/22    BMI:   Wt Readings from Last 3 Encounters:   07/07/22 194 lb 12.8 oz (88.4 kg)   05/16/22 202 lb (91.6 kg)   04/16/21 197 lb (89.4 kg)     Body mass index is 25.01 kg/m². CBC:   Lab Results   Component Value Date/Time    WBC 6.0 02/02/2021 12:00 AM    RBC 4.48 02/02/2021 12:00 AM    HGB 14.8 02/02/2021 12:00 AM    HCT 43.8 02/02/2021 12:00 AM    MCV 97.8 02/02/2021 12:00 AM     02/02/2021 12:00 AM       CMP:   Lab Results   Component Value Date/Time     02/02/2021 12:00 AM    K 4.8 02/02/2021 12:00 AM     02/02/2021 12:00 AM    CO2 32 02/02/2021 12:00 AM    BUN 18 02/02/2021 12:00 AM    CREATININE 1.11 02/02/2021 12:00 AM    LABGLOM 80 02/02/2021 12:00 AM    GLUCOSE 93 02/02/2021 12:00 AM    CALCIUM 9.5 02/02/2021 12:00 AM       POC Tests: No results for input(s): POCGLU, POCNA, POCK, POCCL, POCBUN, POCHEMO, POCHCT in the last 72 hours.     Coags: No results found for: PROTIME, INR, APTT    HCG (If Applicable): No results found for: PREGTESTUR, PREGSERUM, HCG, HCGQUANT     ABGs: No results found for: PHART, PO2ART, SXM6LRU, LGR8NPJ, BEART, Q9QCWJTB     Type & Screen (If Applicable):  No results found for: LABABO, LABRH    Drug/Infectious Status (If Applicable):  No results found for: HIV, HEPCAB    COVID-19 Screening (If Applicable): No results found for: COVID19        Anesthesia Evaluation  Patient summary reviewed and Nursing notes reviewed no history of anesthetic complications:   Airway: Mallampati: II  TM distance: >3 FB   Neck ROM: full  Mouth opening: > = 3 FB   Dental:          Pulmonary:Negative Pulmonary ROS and normal exam                               Cardiovascular:Negative CV ROS  Exercise tolerance: no interval change,                     Neuro/Psych:   Negative Neuro/Psych ROS              GI/Hepatic/Renal: Neg GI/Hepatic/Renal ROS            Endo/Other: Negative Endo/Other ROS                    Abdominal:             Vascular: negative vascular ROS. Other Findings:           Anesthesia Plan      general     ASA 2       Induction: intravenous. Anesthetic plan and risks discussed with patient. Use of blood products discussed with patient whom consented to blood products.    Plan discussed with surgical team.                    RACHANA Lua - CRNA   7/7/2022